# Patient Record
Sex: MALE | Race: BLACK OR AFRICAN AMERICAN | NOT HISPANIC OR LATINO | URBAN - METROPOLITAN AREA
[De-identification: names, ages, dates, MRNs, and addresses within clinical notes are randomized per-mention and may not be internally consistent; named-entity substitution may affect disease eponyms.]

---

## 2017-08-07 ENCOUNTER — EMERGENCY (EMERGENCY)
Facility: HOSPITAL | Age: 22
LOS: 1 days | Discharge: HOME | End: 2017-08-07
Attending: EMERGENCY MEDICINE | Admitting: EMERGENCY MEDICINE
Payer: MEDICAID

## 2017-08-07 VITALS
DIASTOLIC BLOOD PRESSURE: 58 MMHG | HEART RATE: 71 BPM | TEMPERATURE: 98 F | SYSTOLIC BLOOD PRESSURE: 137 MMHG | RESPIRATION RATE: 16 BRPM | OXYGEN SATURATION: 98 %

## 2017-08-07 PROBLEM — Z00.00 ENCOUNTER FOR PREVENTIVE HEALTH EXAMINATION: Status: ACTIVE | Noted: 2017-08-07

## 2017-08-07 PROCEDURE — 72100 X-RAY EXAM L-S SPINE 2/3 VWS: CPT | Mod: 26

## 2017-08-07 PROCEDURE — 99284 EMERGENCY DEPT VISIT MOD MDM: CPT

## 2017-08-07 RX ORDER — IBUPROFEN 200 MG
1 TABLET ORAL
Qty: 20 | Refills: 0 | OUTPATIENT
Start: 2017-08-07 | End: 2017-08-12

## 2017-08-07 RX ORDER — KETOROLAC TROMETHAMINE 30 MG/ML
30 SYRINGE (ML) INJECTION ONCE
Qty: 0 | Refills: 0 | Status: DISCONTINUED | OUTPATIENT
Start: 2017-08-07 | End: 2017-08-07

## 2017-08-07 RX ORDER — LIDOCAINE 4 G/100G
1 CREAM TOPICAL ONCE
Qty: 0 | Refills: 0 | Status: COMPLETED | OUTPATIENT
Start: 2017-08-07 | End: 2017-08-07

## 2017-08-07 RX ORDER — DIAZEPAM 5 MG
1 TABLET ORAL
Qty: 9 | Refills: 0 | OUTPATIENT
Start: 2017-08-07 | End: 2017-08-10

## 2017-08-07 RX ADMIN — Medication 30 MILLIGRAM(S): at 20:07

## 2017-08-07 RX ADMIN — LIDOCAINE 1 PATCH: 4 CREAM TOPICAL at 20:07

## 2017-08-07 NOTE — ED PROVIDER NOTE - OBJECTIVE STATEMENT
22y M with hx of asthma presents to the ED for lower back pain after lifting heavy cabinet x6 days. Pt reports working at [employer]. Denies weakness or numbness in legs

## 2017-08-07 NOTE — ED PROVIDER NOTE - CONSTITUTIONAL, MLM
normal... Well appearing, well nourished, awake, alert, oriented to person, place, time/situation and in no apparent distress. Vital signs stable

## 2017-08-07 NOTE — ED PROVIDER NOTE - PROGRESS NOTE DETAILS
Momole: Discussed with patient need to return to ED if symptoms don't continue to improve or recur or develops any new or worsening symptoms that are of concern. Reviewed discharge instructions for discharge. All pts questions answered. Patient verbalizes understanding.

## 2017-08-07 NOTE — ED PROVIDER NOTE - MUSCULOSKELETAL, MLM
Spine appears normal, range of motion is not limited, no muscle or joint tenderness. Pt wearing wrestling belt for compression over lumbar back. + minimal tenderness to midline lumbar

## 2017-08-07 NOTE — ED PROVIDER NOTE - CARE PLAN
Principal Discharge DX:	Back pain  Instructions for follow-up, activity and diet:	Follow with your primary care provider within 48-72 hours.  Rest, no heavy lifting.  Warm compresses to area.  Light walking. Take Motrin 600 mg every 8 hours for pain with food, Valium 5mg every 8 hours as needed for muscle spasm- caution drowsiness/do not drive. Any worsening pain, weakness, numbness, bowel or urinary incontinence return to ER

## 2017-08-07 NOTE — ED PROVIDER NOTE - PLAN OF CARE
Follow with your primary care provider within 48-72 hours.  Rest, no heavy lifting.  Warm compresses to area.  Light walking. Take Motrin 600 mg every 8 hours for pain with food, Valium 5mg every 8 hours as needed for muscle spasm- caution drowsiness/do not drive. Any worsening pain, weakness, numbness, bowel or urinary incontinence return to ER

## 2019-02-24 ENCOUNTER — EMERGENCY (EMERGENCY)
Facility: HOSPITAL | Age: 24
LOS: 1 days | Discharge: HOME | End: 2019-02-24
Admitting: EMERGENCY MEDICINE
Payer: MEDICAID

## 2019-02-24 VITALS
DIASTOLIC BLOOD PRESSURE: 50 MMHG | RESPIRATION RATE: 16 BRPM | HEART RATE: 75 BPM | SYSTOLIC BLOOD PRESSURE: 109 MMHG | TEMPERATURE: 99 F | OXYGEN SATURATION: 100 %

## 2019-02-24 PROCEDURE — 99283 EMERGENCY DEPT VISIT LOW MDM: CPT

## 2019-02-24 RX ORDER — DIPHENHYDRAMINE HCL 50 MG
25 CAPSULE ORAL ONCE
Qty: 0 | Refills: 0 | Status: COMPLETED | OUTPATIENT
Start: 2019-02-24 | End: 2019-02-24

## 2019-02-24 RX ORDER — KETOROLAC TROMETHAMINE 30 MG/ML
30 SYRINGE (ML) INJECTION ONCE
Qty: 0 | Refills: 0 | Status: DISCONTINUED | OUTPATIENT
Start: 2019-02-24 | End: 2019-02-24

## 2019-02-24 RX ADMIN — Medication 25 MILLIGRAM(S): at 19:34

## 2019-02-24 RX ADMIN — Medication 30 MILLIGRAM(S): at 19:34

## 2019-02-24 NOTE — ED PROVIDER NOTE - OBJECTIVE STATEMENT
22 yo M, with PMH of asthma, recently treated with contact dermatitis presents to ER c/o worsening of b/l leg pain. Pt states last friday, he used a new lotion "Rubee", and shortly developed itchy rash all over his body, and aching pain. Reports he was seen at [place] ER, treated with prednisone, Benadryl, and Pepcid with improvement, but he noted b/l leg pain has increased. Admits rash has been improving, using steroid creams. Admits taking Motrin with mild relief. Admits he's also have a cold with cough, taking Azithromycin from previous ED visit, has neg CXR. Denies any fever, chills, n/v/d, chest pain, throat pain, lip swelling, sob, back pain, dysuria, or any other complaints. 22 yo M, with PMH of asthma, recently treated with contact dermatitis presents to ER c/o worsening of b/l leg pain. Pt states last Friday, he used a new lotion "Rubee", and shortly developed itchy rash all over his body, and aching pain. Reports he was seen at [facility], treated with prednisone, Benadryl, and Pepcid with improvement, but he noted b/l leg pain has increased. Admits rash has been improving, using steroid creams. Admits taking Motrin with mild relief. Admits he's also have a cold with cough, taking Azithromycin from previous ED visit, has neg CXR. Denies any fever, chills, n/v/d, chest pain, throat pain, lip swelling, sob, back pain, dysuria, or any other complaints.

## 2019-02-24 NOTE — ED PROVIDER NOTE - NSFOLLOWUPINSTRUCTIONS_ED_ALL_ED_FT
Rest, drink plenty of fluids.  Advance activity as tolerated.  Continue all previously prescribed medications as directed.  Take Tylenol 650mg (Two 325 mg pills) every 4-6 hours as needed for pain. Take Motrin 600 mg every 8 hours as needed for moderate pain -- take with food. Follow up with your primary care physician and allergist in 48-72 hours- bring copies of your results.  Return to the ER for worsening or persistent symptoms, and/or ANY NEW OR CONCERNING SYMPTOMS. If you have issues obtaining follow up, please call: [phone] to obtain a doctor or specialist who takes your insurance in your area.

## 2019-02-24 NOTE — ED ADULT TRIAGE NOTE - CHIEF COMPLAINT QUOTE
pt comes to ED for rash pt was seen at [place] on Friday and dx with a allergic reaction to a lotion. pt is on pepcid, steriod, and benadryl. pt states he still has the rash. pt can speak in full sentences NAD

## 2019-02-24 NOTE — ED PROVIDER NOTE - PROGRESS NOTE DETAILS
PA LUANNE: Patient reassessed, sitting comfortably in chair in NAD, denies any complaints. States feeling better, symptoms improved. Pt is medically stable for discharge and follow up with PMD and allergist. The patient was given verbal and written discharge instructions. Specifically, instructions when to return to the ED and when to seek follow-up from their pcp was discussed. Any specialty follow-up was discussed, including how to make an appointment.  Instructions were discussed in simple, plain language and was understood by the patient. The patient understands that should their symptoms worsen or any new symptoms arise, they should return to the ED immediately for further evaluation. All pt's questions were answered. Patient verbalizes understanding. PA LUANNE: Patient reassessed, sitting comfortably in chair in NAD, denies any complaints. States feeling better, symptoms improved and wants go leave. TANIYA STROUD: Pt walked out of ER. Call and spoke with patient, states he was on his way out, thought he was done. Discharge instructions given over the phone to f/u with dermatologist and PCP, continue previous medications for allergic reaction, and tylenols and motrin for pain as needed. Pt verbalizes understanding.

## 2019-02-24 NOTE — ED PROVIDER NOTE - EXTREMITY EXAM
Moving all extremities, sensation intact b/l; mild diffuse muscle tenderness to b/l lower extremity; no edema b/l; no palpable cords b/l, no emma's sign b/l.

## 2019-02-24 NOTE — ED PROVIDER NOTE - CLINICAL SUMMARY MEDICAL DECISION MAKING FREE TEXT BOX
22 yo M, with PMH of asthma, recently treated with contact dermatitis presents to ER c/o worsening of b/l leg pain. Well appearing male p/w contact dermatitis a/w body lotion use, recently treated at [place] ED with improvement of rash, had neg CXR for cough, no throat swelling, no sob, increased leg pain likely a/w contact dermatitis. Plan: pain control, Pt due for Benadryl, already took Pepcid and Prednisone today, reassess and derm f/u.

## 2020-08-25 NOTE — ED ADULT TRIAGE NOTE - ACCOMPANIED BY
Immediate family member
Verbalized Understanding/Patient asked questions/Returned Demonstration/Simple: Patient demonstrates quick and easy understanding

## 2022-07-01 ENCOUNTER — APPOINTMENT (OUTPATIENT)
Dept: UROLOGY | Facility: CLINIC | Age: 27
End: 2022-07-01

## 2022-07-01 VITALS
OXYGEN SATURATION: 97 % | DIASTOLIC BLOOD PRESSURE: 71 MMHG | TEMPERATURE: 97.3 F | RESPIRATION RATE: 16 BRPM | HEIGHT: 76 IN | WEIGHT: 260 LBS | SYSTOLIC BLOOD PRESSURE: 121 MMHG | BODY MASS INDEX: 31.66 KG/M2 | HEART RATE: 74 BPM

## 2022-07-01 DIAGNOSIS — Z78.9 OTHER SPECIFIED HEALTH STATUS: ICD-10-CM

## 2022-07-01 DIAGNOSIS — Z87.09 PERSONAL HISTORY OF OTHER DISEASES OF THE RESPIRATORY SYSTEM: ICD-10-CM

## 2022-07-01 DIAGNOSIS — N48.89 OTHER SPECIFIED DISORDERS OF PENIS: ICD-10-CM

## 2022-07-01 PROCEDURE — 99203 OFFICE O/P NEW LOW 30 MIN: CPT

## 2022-07-01 RX ORDER — FLUTICASONE PROPIONATE AND SALMETEROL 100; 50 UG/1; UG/1
100-50 POWDER RESPIRATORY (INHALATION)
Qty: 60 | Refills: 0 | Status: ACTIVE | COMMUNITY
Start: 2022-04-06

## 2022-07-01 RX ORDER — FLUTICASONE PROPIONATE 50 UG/1
50 SPRAY, METERED NASAL
Qty: 16 | Refills: 0 | Status: ACTIVE | COMMUNITY
Start: 2022-04-05

## 2022-07-01 RX ORDER — ALBUTEROL SULFATE 90 UG/1
108 (90 BASE) INHALANT RESPIRATORY (INHALATION)
Qty: 8 | Refills: 0 | Status: ACTIVE | COMMUNITY
Start: 2022-04-05

## 2022-07-01 RX ORDER — EMTRICITABINE AND TENOFOVIR DISOPROXIL FUMARATE 200; 300 MG/1; MG/1
200-300 TABLET, FILM COATED ORAL
Qty: 30 | Refills: 0 | Status: ACTIVE | COMMUNITY
Start: 2022-04-28

## 2022-07-01 RX ORDER — ARIPIPRAZOLE 10 MG/1
10 TABLET ORAL
Qty: 30 | Refills: 0 | Status: ACTIVE | COMMUNITY
Start: 2022-05-04

## 2022-07-01 NOTE — HISTORY OF PRESENT ILLNESS
[FreeTextEntry1] : He is a 26-year-old man who is seen today for initial visit.  Since age 12 he has noticed small whitish areas around the head of the penis.  He does not bother him.  There is no discharge.  There is no hematuria or dysuria.  He does not complain of urinary symptoms or erectile dysfunction.  STD testing by his primary care physician was normal previously.  He is circumcised.

## 2022-07-01 NOTE — ASSESSMENT
[FreeTextEntry1] : He has no penile lesions.  He has several tiny 1 to 2 mm whitish lesions consistent with tiny sebaceous cysts near the frenulum of the penis ventrally.  Would not recommend any further intervention at this time.  If they become larger or bothersome, they could surgically be removed however.  We will continue to monitor and follow-up in the future if needed.\par \par Gene Francois MD, FACS\par [facility]\par  of Urology\par \par [address], [address]\[place] [address]\par \par [address], [address]\[place] [address]\par \par Tel: [phone]\par Fax: [phone]

## 2022-07-01 NOTE — PHYSICAL EXAM
[General Appearance - Well Developed] : well developed [General Appearance - Well Nourished] : well nourished [Normal Appearance] : normal appearance [Well Groomed] : well groomed [General Appearance - In No Acute Distress] : no acute distress [Edema] : no peripheral edema [Respiration, Rhythm And Depth] : normal respiratory rhythm and effort [Exaggerated Use Of Accessory Muscles For Inspiration] : no accessory muscle use [Abdomen Soft] : soft [Abdomen Tenderness] : non-tender [Costovertebral Angle Tenderness] : no ~M costovertebral angle tenderness [Urethral Meatus] : meatus normal [Urinary Bladder Findings] : the bladder was normal on palpation [Scrotum] : the scrotum was normal [Testes Tenderness] : no tenderness of the testes [Testes Mass (___cm)] : there were no testicular masses [FreeTextEntry1] : Circumcised, several tiny whitish sebaceous cysts near the frenulum [Normal Station and Gait] : the gait and station were normal for the patient's age [] : no rash [No Focal Deficits] : no focal deficits [Oriented To Time, Place, And Person] : oriented to person, place, and time [Affect] : the affect was normal [Mood] : the mood was normal [Not Anxious] : not anxious [Inguinal Lymph Nodes Enlarged Bilaterally] : inguinal

## 2022-07-01 NOTE — LETTER BODY
[Dear  ___] : Dear ~VALENTINO, [Consult Letter:] : I had the pleasure of evaluating your patient, [unfilled]. [Consult Closing:] : Thank you very much for allowing me to participate in the care of this patient.  If you have any questions, please do not hesitate to contact me. [FreeTextEntry1] : [facility]\par Address: [address], \[place] [address]\par [phone]